# Patient Record
Sex: FEMALE | Race: WHITE | NOT HISPANIC OR LATINO | Employment: OTHER | URBAN - METROPOLITAN AREA
[De-identification: names, ages, dates, MRNs, and addresses within clinical notes are randomized per-mention and may not be internally consistent; named-entity substitution may affect disease eponyms.]

---

## 2018-02-14 PROBLEM — J18.9 PNEUMONIA OF LEFT LOWER LOBE DUE TO INFECTIOUS ORGANISM: Status: ACTIVE | Noted: 2018-02-14

## 2019-02-05 ENCOUNTER — OFFICE VISIT (OUTPATIENT)
Dept: NEUROLOGY | Facility: CLINIC | Age: 84
End: 2019-02-05
Payer: MEDICARE

## 2019-02-05 VITALS
BODY MASS INDEX: 38.19 KG/M2 | SYSTOLIC BLOOD PRESSURE: 109 MMHG | WEIGHT: 252 LBS | DIASTOLIC BLOOD PRESSURE: 64 MMHG | HEIGHT: 68 IN | HEART RATE: 60 BPM

## 2019-02-05 DIAGNOSIS — F05 DELIRIUM DUE TO ANOTHER MEDICAL CONDITION: Primary | ICD-10-CM

## 2019-02-05 PROCEDURE — 99999 PR PBB SHADOW E&M-NEW PATIENT-LVL III: CPT | Mod: PBBFAC,,, | Performed by: NURSE PRACTITIONER

## 2019-02-05 PROCEDURE — 3288F PR FALLS RISK ASSESSMENT DOCUMENTED: ICD-10-PCS | Mod: CPTII,S$GLB,ICN, | Performed by: NURSE PRACTITIONER

## 2019-02-05 PROCEDURE — 99205 OFFICE O/P NEW HI 60 MIN: CPT | Mod: S$GLB,ICN,, | Performed by: NURSE PRACTITIONER

## 2019-02-05 PROCEDURE — 1100F PTFALLS ASSESS-DOCD GE2>/YR: CPT | Mod: CPTII,S$GLB,ICN, | Performed by: NURSE PRACTITIONER

## 2019-02-05 PROCEDURE — 3288F FALL RISK ASSESSMENT DOCD: CPT | Mod: CPTII,S$GLB,ICN, | Performed by: NURSE PRACTITIONER

## 2019-02-05 PROCEDURE — 99205 PR OFFICE/OUTPT VISIT, NEW, LEVL V, 60-74 MIN: ICD-10-PCS | Mod: S$GLB,ICN,, | Performed by: NURSE PRACTITIONER

## 2019-02-05 PROCEDURE — 1100F PR PT FALLS ASSESS DOC 2+ FALLS/FALL W/INJURY/YR: ICD-10-PCS | Mod: CPTII,S$GLB,ICN, | Performed by: NURSE PRACTITIONER

## 2019-02-05 PROCEDURE — 99999 PR PBB SHADOW E&M-NEW PATIENT-LVL III: ICD-10-PCS | Mod: PBBFAC,,, | Performed by: NURSE PRACTITIONER

## 2019-02-05 RX ORDER — METHENAMINE HIPPURATE 1000 MG/1
1 TABLET ORAL 2 TIMES DAILY
COMMUNITY

## 2019-02-05 RX ORDER — ONDANSETRON 4 MG/1
4 TABLET, FILM COATED ORAL EVERY 8 HOURS PRN
COMMUNITY

## 2019-02-05 RX ORDER — MOMETASONE FUROATE 1 MG/G
CREAM TOPICAL DAILY
COMMUNITY

## 2019-02-05 RX ORDER — ACETAMINOPHEN 500 MG
1 TABLET ORAL DAILY
COMMUNITY

## 2019-02-05 RX ORDER — HYDROCODONE BITARTRATE AND ACETAMINOPHEN 5; 325 MG/1; MG/1
1 TABLET ORAL EVERY 6 HOURS PRN
COMMUNITY

## 2019-02-05 NOTE — PROGRESS NOTES
"Name: Sarah Beth Glass  MRN: [id number]   CSN: [id number]      Date: 2-5-19      Referring physician:  Shelbi Self  No address on file    Subjective:      Chief Complaint: memory issues, hallucinations    History of Present Illness (HPI):    Sarah Beth Glass is a 89 y.o. right-handed female who presents today for an initial evaluation of hallucinations and memory lapse during medicine increase and is accompanied by nursing attendant from [facility].     Ms. Glass offers the following:  She is better now but about a month ago, she was Lexapro and Ativan. Lexapro dose was increased from 10 mg to 20 mg and she began to "hallucinate" and did "crazy thinks." One of the crazy things she did was stand on the bed and slide down onto the floor. She hit her RLE  and the Right side of head. She cannot imagine how she even was able to stand on the bed as she cannot walk. She does not actually remember standing on the bed but the nurses have told her and she recalls what they told her. She did go to ED and was evaluated. She recalls some of the ED visit but not completley. She describes as hallucinations as feeling like she was going on the ceiling and hagning from it. As soon as someone would walk in her room, she would come back down.   She never actually saw or heard anything that was not real.     The "hallucinations" (feeling of being on the ceiling) stopped after she quit taking Lexapro and Ativan.      Since these meds have been stopped, the "hallucinations" have gradually improved. She has not had any for the past 1.5 to 2 weeks to the best of her knowledge.     Other than this period of time, she has never hallucinated.     She describes her memory as okay now but "stupid" during the time of the Lexapro and Ativan. She has otherwise never had a memory issue.     She has not walked for at least one year.        Lives at [facility]. She moved in there about a year ago. Prior to this, she was living in her own home with her " "son, who is schizophrenic. She says he does fine on medicine but he just could not take care of her. She was needing more help and this is why she went to [facility].     __________________________________________________    From ED 12-14-18:   Sarah Beth Glass is a 89 y.o. female with a history of HTN, HLD, and GERD who presents to the ED via EMS from [facility] per the family's request. The family reported that she was having hallucinations. The patient reports that she only has hallucinations, including seeing herself outside her body when she takes Lexapro, which was recently increased by 5 mg about 2 wks ago. The patient reports that she has had "funny feelings" since taking Lexapro and that she has hallucinated. She states that she has told her caretakers about the hallucinations. The patient has been in [facility] for about a year. She requires help to bathe herself and get dressed. She states that she has never experienced these feelings before, and states that she hears voices. She denies pain at this time. The patient reports that she eats well.        The history is provided by the EMS personnel and the pat            Review of Systems   Constitutional: Negative for appetite change.   HENT: Positive for hearing loss ("but tested in normal range"). Negative for trouble swallowing.    Respiratory: Positive for shortness of breath (with exertion). Negative for cough and choking.    Cardiovascular: Negative.    Gastrointestinal: Negative for constipation and diarrhea.   Genitourinary: Positive for frequency.   Musculoskeletal: Positive for gait problem (don't walk at all). Negative for back pain and neck pain.   Neurological: Negative for tremors.   Psychiatric/Behavioral: Positive for dysphoric mood, hallucinations and sleep disturbance (awakes 2AM but goes to sleep early). The patient is not nervous/anxious.        Past Medical History: The patient  has a past medical history of GERD " (gastroesophageal reflux disease), Hyperlipidemia, and Hypertension.    Social History: The patient  reports that she has quit smoking. she has never used smokeless tobacco. She reports that she does not drink alcohol or use drugs.    Family History: Their family history includes COPD in her sister; Heart disease in her father and mother.    Allergies: Iodine and iodide containing products; Iodine containing multivitamin; and Penicillins     Meds:   Current Outpatient Medications on File Prior to Visit   Medication Sig Dispense Refill    apixaban 2.5 mg Tab Take by mouth 2 (two) times daily.      bumetanide (BUMEX) 1 MG tablet Take 1 mg by mouth once daily.      carvedilol (COREG) 12.5 MG tablet Take 12.5 mg by mouth 2 (two) times daily.      cholecalciferol, vitamin D3, (D3-2000) 2,000 unit Cap Take 1 capsule by mouth once daily.      cranberry fruit (CRANBERRY) 450 mg Tab Take by mouth.      furosemide (LASIX) 40 MG tablet Take 1 tablet (40 mg total) by mouth as needed. 45 tablet 2    HYDROcodone-acetaminophen (NORCO) 5-325 mg per tablet Take 1 tablet by mouth every 6 (six) hours as needed for Pain.      lisinopril (PRINIVIL,ZESTRIL) 40 MG tablet Take 1 tablet (40 mg total) by mouth once daily. 90 tablet 1    methenamine (HIPREX) 1 gram Tab Take 1 g by mouth 2 (two) times daily.      metoprolol succinate (TOPROL-XL) 25 MG 24 hr tablet Take 1 tablet (25 mg total) by mouth once daily. (Patient taking differently: Take 100 mg by mouth once daily. ) 90 tablet 1    mometasone 0.1% (ELOCON) 0.1 % cream Apply topically once daily.      multivitamin (THERAGRAN) per tablet Take 1 tablet by mouth once daily.      ondansetron (ZOFRAN) 4 MG tablet Take 4 mg by mouth every 8 (eight) hours as needed for Nausea.      polyethylene glycol (GLYCOLAX) 17 gram PwPk Take 1 g by mouth once daily. 30 packet 0    potassium chloride (MICRO-K) 10 MEQ CpSR Take 20 mEq by mouth once.      buPROPion (WELLBUTRIN) 100 MG tablet  "Take 1 tablet (100 mg total) by mouth 2 (two) times daily. 90 tablet 1    escitalopram oxalate (LEXAPRO) 10 MG tablet Take 1 tablet (10 mg total) by mouth nightly. 30 tablet 0    lorazepam (ATIVAN) 0.5 MG tablet Take 1 tablet (0.5 mg total) by mouth every 8 (eight) hours as needed for Anxiety. 30 tablet 0    nystatin (MYCOSTATIN) cream Apply topically 2 (two) times daily. To abdominal skin folds. 1 Tube 0    pantoprazole (PROTONIX) 40 MG tablet Take 1 tablet (40 mg total) by mouth once daily. 90 tablet 1    senna-docusate 8.6-50 mg (PERICOLACE) 8.6-50 mg per tablet Take 1 tablet by mouth daily as needed for Constipation.      zinc oxide 16 % Oint ointment Apply 1 application topically once daily. To sacrum 113 g 2     No current facility-administered medications on file prior to visit.        Objective:     Physical Exam:    Vitals:    02/05/19 1421   BP: 109/64   Pulse: 60   Weight: 114.3 kg (252 lb)   Height: 5' 8" (1.727 m)     Body mass index is 38.32 kg/m².    Constitutional  Well-developed, well-nourished, appears stated age   Cardiovascular  Radial pulses 2+ and symmetric, no LE edema bilaterally     ..  Neurological Talkative, pleasant   * Mental status  MOCA = 20/25 (visuaospatial deferred due to time)   - Orientation Oriented to: person, place, situation, day of week, month of year and year  Not oriented to: date (3rd 4th, 5th or 6th)     - Memory     provides detailed hx     - Attention/concentration  Normal     - Language  normal     - Fund of knowledge  Aware of current events     - Executive  Well-organized thoughts             ..  * Cranial nerves       - CN II  PERRL, visual fields full to confrontation  +ectropion R eye     - CN III, IV, VI  Extraocular movements full, normal pursuits and saccades     - CN V  Sensation V1 - V3 intact     - CN VII  Face strong and symmetric bilaterally     - CN VIII  Hearing diminished bilaterally      - CN IX, X  Palate raises midline and symmetric     - CN XI "  SCM and trapezius 5/5 bilaterally     - CN XII  Tongue midline   * Motor  Muscle bulk normal, strength 5/5 throughout  RLE deferred (c/o RLE pain from fall/slide off bed a month ago)   * Coordination  No dysmetria with finger-to-nose    * Gait  WC     ..  * Specialized movement exam  No hypophonic speech.    No facial masking.   No cogwheel rigidity.     No bradykinesia.   No tremor with rest, posture, kinesis, or intention.    No other dystonia, chorea, athetosis, myoclonus, or tics.               Laboratory Results:  Admission on 12/14/2018, Discharged on 12/15/2018   Component Date Value Ref Range Status    Specimen UA 12/14/2018 Urine, Catheterized   Final    Color, UA 12/14/2018 Yellow  Yellow, Straw, [place] Final    Appearance, UA 12/14/2018 Clear  Clear Final    pH, UA 12/14/2018 7.0  5.0 - 8.0 Final    Specific Gravity, UA 12/14/2018 1.010  1.005 - 1.030 Final    Protein, UA 12/14/2018 Negative  Negative Final    Glucose, UA 12/14/2018 Negative  Negative Final    Ketones, UA 12/14/2018 Negative  Negative Final    Bilirubin (UA) 12/14/2018 Negative  Negative Final    Occult Blood UA 12/14/2018 Trace* Negative Final    Nitrite, UA 12/14/2018 Negative  Negative Final    Urobilinogen, UA 12/14/2018 1.0  Negative EU/dL Final    Leukocytes, UA 12/14/2018 3+* Negative Final    RBC, UA 12/14/2018 2  0 - 4 /hpf Final    WBC, UA 12/14/2018 70* 0 - 5 /hpf Final    Bacteria, UA 12/14/2018 Moderate* None-Occ /hpf Final    Squam Epithel, UA 12/14/2018 20  /hpf Final    Microscopic Comment 12/14/2018 SEE COMMENT   Final    Urine Culture, Routine 12/14/2018 No significant growth   Final           Assessment and Plan     Delirium due to another medical condition  Comments:  due to UTI and possibly medication adjustments (Lexapro and Ativan)        Medical Decision Making:    Reviewing the ED record from 12-14-18, this note describes her account of the hallucinations. she was discovered to have UTI.            Discussed UTI, hallucinations, delirium, and memory loss with her. Verbalized understanding.     Follow up as needed.         I spent 60 minutes face-to-face with the patient with >50% of the time spent with counseling and education regarding:  - results of data, diagnosis, and recommendations stated above  - the prognosis of UTI, delirium, hallucinations, memory changes  - importance of diet and exercise    Kimberlyn Raymond, DONNIE, NP-C  Division of Movement and Memory Disorders  [facility]  [phone]

## 2020-08-28 NOTE — PROGRESS NOTES
Subjective:      Sarah Beth Glass is a 89 y.o. female who was self-referred for evaluation of her recurrent UTIs. She is a resident at [facility].     The patient has a long history of recurrent UTIs since menopause, 1-3 infections/year. She was admitted at [facility] for a urinary infection several months ago. Treated with IV antibiotics. She was treated by ID several weeks ago with monurol. Continues to report dysuria. Voids per depends. Unable to determine worsening frequency and urgency. Denies flank pain, fever/chills and gross hematuria. Denies a history of nephrolithiasis.       Unfortunately no records are available.     The following portions of the patient's history were reviewed and updated as appropriate: allergies, current medications, past family history, past medical history, past social history, past surgical history and problem list.    Review of Systems  Constitutional: no fever or chills  ENT: no nasal congestion or sore throat  Respiratory: no cough or shortness of breath  Cardiovascular: no chest pain or palpitations  Gastrointestinal: no nausea or vomiting, tolerating diet  Genitourinary: as per HPI  Hematologic/Lymphatic: no easy bruising or lymphadenopathy  Musculoskeletal: no arthralgias or myalgias  Neurological: no seizures or tremors  Behavioral/Psych: no auditory or visual hallucinations     Objective:   Vital Signs:   Vitals:    08/31/20 1137   BP: (!) 142/65   Pulse: (!) 52       Physical Exam   General: alert and oriented, no acute distress  Head: normocephalic, atraumatic  Neck: supple, no lymphadenopathy, normal ROM, no masses  Respiratory: Symmetric expansion, non-labored breathing  Cardiovascular: regular rate and rhythm, nomal pulses, no peripheral edema  Abdomen: soft, non tender, non distended, no palpable masses, no hernias, no hepatomegaly or splenomegaly  Pelvic:deferred  Lymphatic: no inguinal nodes  Skin: normal coloration and turgor, no rashes, no suspicious skin  lesions noted  Neuro: alert and oriented x3, no gross deficits; WC bound   Psych: normal judgment and insight, normal mood/affect and non-anxious  No CVA tenderness    Lab Review   Urinalysis demonstrates : no specimen, voids per depends  Lab Results   Component Value Date    WBC 5.50 02/21/2018    HGB 13.0 02/21/2018    HCT 39.3 02/21/2018    MCV 86 02/21/2018     02/21/2018     Lab Results   Component Value Date    CREATININE 0.5 02/21/2018    BUN 10 02/21/2018       Imaging   None    Assessment:   Recurrent UTI    Plan:   Sarah Beth was seen today for new consult.    Diagnoses and all orders for this visit:    Recurrent UTI  -     US Retroperitoneal Complete (Kidney and; Future  -     estradioL (ESTRACE) 0.01 % (0.1 mg/gram) vaginal cream; Place 1 g vaginally twice a week.    Other orders  -     Cancel: POCT URINE DIPSTICK WITHOUT MICROSCOPE  -     Cancel: POCT Bladder Scan    Plan:  1. Discussed various etiologies for recurrent UTIs as well as the difference between recurrent and persistent UTIs. Explained that without her outside records, I'm unable to determine which is the case with her.  2. Renal US to r/o hydro and/or stones.  3. Cath urine specimen at the nursing home. Will treat if needed with appropriate antibiotics once results are received   4. Expressed importance of obtaining culture any time she thinks she has UTI  5. Discussed preventive measures including adequate hydration and regular voiding  6. Will start estrace cream for atrophic vaginitis- denies hx of breast, ovarian, uterine cancer. Denies hx of DVT and PE  7. Discussed prophylactic antibiotics, defer for now   8. Follow up PRN

## 2020-08-31 ENCOUNTER — OFFICE VISIT (OUTPATIENT)
Dept: UROLOGY | Facility: CLINIC | Age: 85
End: 2020-08-31
Payer: MEDICARE

## 2020-08-31 VITALS — DIASTOLIC BLOOD PRESSURE: 65 MMHG | HEART RATE: 52 BPM | SYSTOLIC BLOOD PRESSURE: 142 MMHG

## 2020-08-31 DIAGNOSIS — N39.0 RECURRENT UTI: Primary | ICD-10-CM

## 2020-08-31 PROCEDURE — 1159F MED LIST DOCD IN RCRD: CPT | Mod: S$GLB,,, | Performed by: NURSE PRACTITIONER

## 2020-08-31 PROCEDURE — 99204 PR OFFICE/OUTPT VISIT, NEW, LEVL IV, 45-59 MIN: ICD-10-PCS | Mod: S$GLB,,, | Performed by: NURSE PRACTITIONER

## 2020-08-31 PROCEDURE — 1159F PR MEDICATION LIST DOCUMENTED IN MEDICAL RECORD: ICD-10-PCS | Mod: S$GLB,,, | Performed by: NURSE PRACTITIONER

## 2020-08-31 PROCEDURE — 99204 OFFICE O/P NEW MOD 45 MIN: CPT | Mod: S$GLB,,, | Performed by: NURSE PRACTITIONER

## 2020-08-31 PROCEDURE — 1101F PT FALLS ASSESS-DOCD LE1/YR: CPT | Mod: CPTII,S$GLB,, | Performed by: NURSE PRACTITIONER

## 2020-08-31 PROCEDURE — 1101F PR PT FALLS ASSESS DOC 0-1 FALLS W/OUT INJ PAST YR: ICD-10-PCS | Mod: CPTII,S$GLB,, | Performed by: NURSE PRACTITIONER

## 2020-08-31 RX ORDER — CALCIUM CARB/VITAMIN D3/VIT K1 500-500-40
TABLET,CHEWABLE ORAL
COMMUNITY
Start: 2018-12-20

## 2020-08-31 RX ORDER — ESTRADIOL 0.1 MG/G
1 CREAM VAGINAL
Qty: 8 G | Refills: 11 | Status: SHIPPED | OUTPATIENT
Start: 2020-08-31 | End: 2021-08-31

## 2020-08-31 RX ORDER — POLYETHYLENE GLYCOL 3350 17 G/17G
17 POWDER, FOR SOLUTION ORAL
COMMUNITY
Start: 2018-12-20

## 2020-08-31 RX ORDER — IBUPROFEN 400 MG/1
400 TABLET ORAL
COMMUNITY
Start: 2018-12-20

## 2020-08-31 RX ORDER — ACETAMINOPHEN 325 MG/1
650 TABLET ORAL
COMMUNITY
Start: 2019-05-05

## 2020-08-31 RX ORDER — ERYTHROMYCIN 5 MG/G
OINTMENT OPHTHALMIC
COMMUNITY
Start: 2020-06-24

## 2020-09-03 ENCOUNTER — HOSPITAL ENCOUNTER (OUTPATIENT)
Dept: RADIOLOGY | Facility: OTHER | Age: 85
Discharge: HOME | End: 2020-09-03
Attending: NURSE PRACTITIONER
Payer: MEDICARE

## 2020-09-03 DIAGNOSIS — N39.0 RECURRENT UTI: ICD-10-CM

## 2020-09-03 PROCEDURE — 76770 US RETROPERITONEAL COMPLETE: ICD-10-PCS | Mod: 26,,, | Performed by: RADIOLOGY

## 2020-09-03 PROCEDURE — 76770 US EXAM ABDO BACK WALL COMP: CPT | Mod: 26,,, | Performed by: RADIOLOGY

## 2020-09-03 PROCEDURE — 76770 US EXAM ABDO BACK WALL COMP: CPT | Mod: TC

## 2021-01-26 ENCOUNTER — HOSPITAL ENCOUNTER (EMERGENCY)
Facility: OTHER | Age: 86
Discharge: HOME | End: 2021-01-26
Attending: EMERGENCY MEDICINE
Payer: MEDICARE

## 2021-01-26 VITALS
TEMPERATURE: 98 F | HEART RATE: 58 BPM | HEIGHT: 68 IN | OXYGEN SATURATION: 96 % | WEIGHT: 225 LBS | RESPIRATION RATE: 17 BRPM | BODY MASS INDEX: 34.1 KG/M2 | DIASTOLIC BLOOD PRESSURE: 86 MMHG | SYSTOLIC BLOOD PRESSURE: 182 MMHG

## 2021-01-26 DIAGNOSIS — R31.9 URINARY TRACT INFECTION WITH HEMATURIA, SITE UNSPECIFIED: Primary | ICD-10-CM

## 2021-01-26 DIAGNOSIS — S81.801A OPEN WOUND OF RIGHT LOWER LEG, INITIAL ENCOUNTER: ICD-10-CM

## 2021-01-26 DIAGNOSIS — N39.0 URINARY TRACT INFECTION WITH HEMATURIA, SITE UNSPECIFIED: Primary | ICD-10-CM

## 2021-01-26 DIAGNOSIS — L08.9 SUPERFICIAL SKIN INFECTION: ICD-10-CM

## 2021-01-26 LAB
ALBUMIN SERPL BCP-MCNC: 2.9 G/DL (ref 3.5–5.2)
ALP SERPL-CCNC: 99 U/L (ref 55–135)
ALT SERPL W/O P-5'-P-CCNC: 14 U/L (ref 10–44)
ANION GAP SERPL CALC-SCNC: 11 MMOL/L (ref 8–16)
AST SERPL-CCNC: 20 U/L (ref 10–40)
BACTERIA #/AREA URNS HPF: ABNORMAL /HPF
BASOPHILS # BLD AUTO: 0.03 K/UL (ref 0–0.2)
BASOPHILS NFR BLD: 0.4 % (ref 0–1.9)
BILIRUB SERPL-MCNC: 0.5 MG/DL (ref 0.1–1)
BILIRUB UR QL STRIP: NEGATIVE
BUN SERPL-MCNC: 14 MG/DL (ref 10–30)
CALCIUM SERPL-MCNC: 8.9 MG/DL (ref 8.7–10.5)
CHLORIDE SERPL-SCNC: 100 MMOL/L (ref 95–110)
CLARITY UR: CLEAR
CO2 SERPL-SCNC: 28 MMOL/L (ref 23–29)
COLOR UR: YELLOW
CREAT SERPL-MCNC: 0.8 MG/DL (ref 0.5–1.4)
DIFFERENTIAL METHOD: NORMAL
EOSINOPHIL # BLD AUTO: 0.3 K/UL (ref 0–0.5)
EOSINOPHIL NFR BLD: 4.9 % (ref 0–8)
ERYTHROCYTE [DISTWIDTH] IN BLOOD BY AUTOMATED COUNT: 14 % (ref 11.5–14.5)
EST. GFR  (AFRICAN AMERICAN): >60 ML/MIN/1.73 M^2
EST. GFR  (NON AFRICAN AMERICAN): >60 ML/MIN/1.73 M^2
GLUCOSE SERPL-MCNC: 93 MG/DL (ref 70–110)
GLUCOSE UR QL STRIP: NEGATIVE
HCT VFR BLD AUTO: 47.5 % (ref 37–48.5)
HGB BLD-MCNC: 15.8 G/DL (ref 12–16)
HGB UR QL STRIP: ABNORMAL
IMM GRANULOCYTES # BLD AUTO: 0.02 K/UL (ref 0–0.04)
IMM GRANULOCYTES NFR BLD AUTO: 0.3 % (ref 0–0.5)
KETONES UR QL STRIP: NEGATIVE
LACTATE SERPL-SCNC: 1 MMOL/L (ref 0.5–2.2)
LEUKOCYTE ESTERASE UR QL STRIP: ABNORMAL
LYMPHOCYTES # BLD AUTO: 1.6 K/UL (ref 1–4.8)
LYMPHOCYTES NFR BLD: 23 % (ref 18–48)
MCH RBC QN AUTO: 30.3 PG (ref 27–31)
MCHC RBC AUTO-ENTMCNC: 33.3 G/DL (ref 32–36)
MCV RBC AUTO: 91 FL (ref 82–98)
MICROSCOPIC COMMENT: ABNORMAL
MONOCYTES # BLD AUTO: 0.8 K/UL (ref 0.3–1)
MONOCYTES NFR BLD: 11.9 % (ref 4–15)
NEUTROPHILS # BLD AUTO: 4.1 K/UL (ref 1.8–7.7)
NEUTROPHILS NFR BLD: 59.5 % (ref 38–73)
NITRITE UR QL STRIP: NEGATIVE
NRBC BLD-RTO: 0 /100 WBC
PH UR STRIP: 7 [PH] (ref 5–8)
PLATELET # BLD AUTO: 256 K/UL (ref 150–350)
PMV BLD AUTO: 9.4 FL (ref 9.2–12.9)
POTASSIUM SERPL-SCNC: 4.4 MMOL/L (ref 3.5–5.1)
PROT SERPL-MCNC: 6.8 G/DL (ref 6–8.4)
PROT UR QL STRIP: ABNORMAL
RBC # BLD AUTO: 5.21 M/UL (ref 4–5.4)
RBC #/AREA URNS HPF: 12 /HPF (ref 0–4)
SODIUM SERPL-SCNC: 139 MMOL/L (ref 136–145)
SP GR UR STRIP: 1.01 (ref 1–1.03)
SQUAMOUS #/AREA URNS HPF: 70 /HPF
URN SPEC COLLECT METH UR: ABNORMAL
UROBILINOGEN UR STRIP-ACNC: NEGATIVE EU/DL
WBC # BLD AUTO: 6.96 K/UL (ref 3.9–12.7)
WBC #/AREA URNS HPF: >100 /HPF (ref 0–5)
WBC CLUMPS URNS QL MICRO: ABNORMAL

## 2021-01-26 PROCEDURE — 87086 URINE CULTURE/COLONY COUNT: CPT

## 2021-01-26 PROCEDURE — 25000003 PHARM REV CODE 250: Performed by: EMERGENCY MEDICINE

## 2021-01-26 PROCEDURE — 99283 EMERGENCY DEPT VISIT LOW MDM: CPT | Mod: 25

## 2021-01-26 PROCEDURE — 80053 COMPREHEN METABOLIC PANEL: CPT

## 2021-01-26 PROCEDURE — 81000 URINALYSIS NONAUTO W/SCOPE: CPT

## 2021-01-26 PROCEDURE — 85025 COMPLETE CBC W/AUTO DIFF WBC: CPT

## 2021-01-26 PROCEDURE — 83605 ASSAY OF LACTIC ACID: CPT

## 2021-01-26 RX ORDER — MUPIROCIN 20 MG/G
1 OINTMENT TOPICAL
Status: COMPLETED | OUTPATIENT
Start: 2021-01-26 | End: 2021-01-26

## 2021-01-26 RX ORDER — CEPHALEXIN 500 MG/1
500 CAPSULE ORAL EVERY 8 HOURS
Qty: 15 CAPSULE | Refills: 0 | Status: SHIPPED | OUTPATIENT
Start: 2021-01-26 | End: 2021-01-31

## 2021-01-26 RX ORDER — ACETAMINOPHEN 325 MG/1
650 TABLET ORAL
Status: COMPLETED | OUTPATIENT
Start: 2021-01-26 | End: 2021-01-26

## 2021-01-26 RX ORDER — CEPHALEXIN 500 MG/1
500 CAPSULE ORAL
Status: COMPLETED | OUTPATIENT
Start: 2021-01-26 | End: 2021-01-26

## 2021-01-26 RX ADMIN — ACETAMINOPHEN 650 MG: 325 TABLET, FILM COATED ORAL at 01:01

## 2021-01-26 RX ADMIN — MUPIROCIN 22 G: 20 OINTMENT TOPICAL at 01:01

## 2021-01-26 RX ADMIN — CEPHALEXIN 500 MG: 500 CAPSULE ORAL at 01:01

## 2021-01-27 LAB
BACTERIA UR CULT: NORMAL
BACTERIA UR CULT: NORMAL